# Patient Record
Sex: FEMALE | Race: WHITE | NOT HISPANIC OR LATINO | URBAN - METROPOLITAN AREA
[De-identification: names, ages, dates, MRNs, and addresses within clinical notes are randomized per-mention and may not be internally consistent; named-entity substitution may affect disease eponyms.]

---

## 2024-10-14 ENCOUNTER — OFFICE VISIT (OUTPATIENT)
Dept: URGENT CARE | Age: 34
End: 2024-10-14
Payer: COMMERCIAL

## 2024-10-14 VITALS
DIASTOLIC BLOOD PRESSURE: 76 MMHG | OXYGEN SATURATION: 98 % | RESPIRATION RATE: 18 BRPM | HEART RATE: 77 BPM | SYSTOLIC BLOOD PRESSURE: 118 MMHG | TEMPERATURE: 98.4 F

## 2024-10-14 DIAGNOSIS — S66.922A LACERATION OF LEFT HAND INVOLVING TENDON, INITIAL ENCOUNTER: Primary | ICD-10-CM

## 2024-10-14 DIAGNOSIS — S61.452A DOG BITE OF LEFT HAND, INITIAL ENCOUNTER: ICD-10-CM

## 2024-10-14 DIAGNOSIS — S61.412A LACERATION OF LEFT HAND INVOLVING TENDON, INITIAL ENCOUNTER: Primary | ICD-10-CM
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
## 2024-10-14 PROCEDURE — 99203 OFFICE O/P NEW LOW 30 MIN: CPT | Performed by: SPECIALIST

## 2024-10-14 RX ORDER — MUPIROCIN 20 MG/G
OINTMENT TOPICAL
Qty: 22 G | Refills: 0 | Status: SHIPPED | OUTPATIENT
Start: 2024-10-14

## 2024-10-14 RX ORDER — AMOXICILLIN AND CLAVULANATE POTASSIUM 875; 125 MG/1; MG/1
1 TABLET, FILM COATED ORAL 2 TIMES DAILY
Qty: 20 TABLET | Refills: 0 | Status: SHIPPED | OUTPATIENT
Start: 2024-10-14 | End: 2024-10-24

## 2024-10-14 ASSESSMENT — ENCOUNTER SYMPTOMS: CONSTITUTIONAL NEGATIVE: 1

## 2024-10-14 NOTE — PATIENT INSTRUCTIONS
Wash the wound with soap and water daily, pat dry  and apply topical Antibiotic ointment, cover with non adhesive dressing.   Take Probiotics and or eat yogurt for 2 weeks  Return for suture removal in a week

## 2024-10-14 NOTE — PROGRESS NOTES
"Subjective   Patient ID: Faina Villeda \"Judson" is a 34 y.o. female. They present today with a chief complaint of Animal Bite (Left hand bit by her dog 1 hour ago. Dog is fully vaccinated. Patient had tetanus last within last 2 years. Left hand has open wound on dorsal side. ).    History of Present Illness  HPI    Past Medical History  Allergies as of 10/14/2024    (No Known Allergies)       (Not in a hospital admission)       History reviewed. No pertinent past medical history.    History reviewed. No pertinent surgical history.         Review of Systems  Review of Systems   Constitutional: Negative.    Skin: Negative.                                   Objective    Vitals:    10/14/24 1445   BP: 118/76   BP Location: Right arm   Patient Position: Sitting   Pulse: 77   Resp: 18   Temp: 36.9 °C (98.4 °F)   SpO2: 98%     Patient's last menstrual period was 10/09/2024 (exact date).    Physical Exam  Constitutional:       Appearance: Normal appearance. She is normal weight.   Skin:     General: Skin is warm.      Findings: Laceration present.      Comments: Linear 2cm laceration on dorsal aspect of left hand, subcutaneous is visible, and there is possible partial cut of extensor tendon   Neurological:      Mental Status: She is alert.         Laceration Repair    Date/Time: 10/14/2024 3:36 PM    Performed by: Cha Prince MD  Authorized by: Cha Prince MD    Consent:     Consent obtained:  Verbal    Consent given by:  Patient    Risks, benefits, and alternatives were discussed: yes      Risks discussed:  Infection  Universal protocol:     Procedure explained and questions answered to patient or proxy's satisfaction: yes      Patient identity confirmed:  Verbally with patient  Anesthesia:     Anesthesia method:  Local infiltration  Laceration details:     Location:  Hand    Hand location:  L hand, dorsum    Length (cm):  2    Depth (mm):  4  Pre-procedure details:     Preparation:  Patient was prepped and " draped in usual sterile fashion  Exploration:     Limited defect created (wound extended): yes      Wound exploration: entire depth of wound visualized      Wound extent: tendon damage      Tendon damage location:  Upper extremity    Upper extremity tendon damage location:  Finger extensor    Tendon damage extent:  Partial transection    Tendon repair plan:  Refer for evaluation    Contaminated: no    Treatment:     Area cleansed with:  Povidone-iodine and chlorhexidine    Amount of cleaning:  Standard    Visualized foreign bodies/material removed: no      Debridement:  Minimal    Undermining:  None    Layers/structures repaired:  Deep subcutaneous  Deep subcutaneous:     Number of sutures:  1  Skin repair:     Repair method:  Sutures    Suture material:  Nylon    Suture technique:  Simple interrupted  Approximation:     Approximation:  Loose  Post-procedure details:     Dressing:  Antibiotic ointment and sterile dressing    Procedure completion:  Tolerated well, no immediate complications      Point of Care Test & Imaging Results from this visit  No results found for this visit on 10/14/24.   No results found.    Diagnostic study results (if any) were reviewed by Cha Prince MD.    Assessment/Plan   Allergies, medications, history, and pertinent labs/EKGs/Imaging reviewed by Cha Prince MD.     Medical Decision Making  Partial  closureof laceratin, Vs. Complete closure, due to risk for infection      Orders and Diagnoses  There are no diagnoses linked to this encounter.    Medical Admin Record      Patient disposition: Home    Electronically signed by Cha Prince MD  3:01 PM